# Patient Record
Sex: MALE | Race: WHITE | NOT HISPANIC OR LATINO | Employment: FULL TIME | URBAN - METROPOLITAN AREA
[De-identification: names, ages, dates, MRNs, and addresses within clinical notes are randomized per-mention and may not be internally consistent; named-entity substitution may affect disease eponyms.]

---

## 2021-04-26 ENCOUNTER — OFFICE VISIT (OUTPATIENT)
Dept: PODIATRY | Facility: CLINIC | Age: 36
End: 2021-04-26
Payer: COMMERCIAL

## 2021-04-26 ENCOUNTER — ANCILLARY PROCEDURE (OUTPATIENT)
Dept: GENERAL RADIOLOGY | Facility: CLINIC | Age: 36
End: 2021-04-26
Attending: PODIATRIST
Payer: COMMERCIAL

## 2021-04-26 VITALS
HEIGHT: 73 IN | WEIGHT: 256.5 LBS | DIASTOLIC BLOOD PRESSURE: 100 MMHG | SYSTOLIC BLOOD PRESSURE: 150 MMHG | BODY MASS INDEX: 34 KG/M2

## 2021-04-26 DIAGNOSIS — M79.672 LEFT FOOT PAIN: Primary | ICD-10-CM

## 2021-04-26 DIAGNOSIS — M79.672 LEFT FOOT PAIN: ICD-10-CM

## 2021-04-26 PROCEDURE — 73630 X-RAY EXAM OF FOOT: CPT | Mod: LT | Performed by: RADIOLOGY

## 2021-04-26 PROCEDURE — 99203 OFFICE O/P NEW LOW 30 MIN: CPT | Performed by: PODIATRIST

## 2021-04-26 ASSESSMENT — MIFFLIN-ST. JEOR: SCORE: 2134.42

## 2021-04-26 NOTE — LETTER
4/26/2021         RE: Richard Workman  [address] [place] [address]        Dear Colleague,    Thank you for referring your patient, Richard Workman, to the [facility]. Please see a copy of my visit note below.    PATIENT HISTORY:  Richard Workman is a 36 year old male who presents to clinic for left foot pain.  1 week duration.  Patient states he woke up with swelling and pain over the dorsal lateral left foot.  Denies injury.  Pain is worse after rest and he feels it is better with light activity.  He does have pain with walking occasionally as well.  He is wondering if it is a ganglion cyst.    Review of Systems:  Patient denies fever, chills, rash, wound, stiffness,  numbness, weakness, heart burn, blood in stool, chest pain with activity, calf pain when walking, shortness of breath with activity, chronic cough, easy bleeding/bruising, swelling of ankles, excessive thirst, fatigue, depression, anxiety.  Patient admits to limping.     PAST MEDICAL HISTORY: No pertinent past medical history.     PAST SURGICAL HISTORY: No pertinent past surgical history.     MEDICATIONS: No current outpatient medications on file.     ALLERGIES:  No Known Allergies     SOCIAL HISTORY:   Social History     Socioeconomic History     Marital status:      Spouse name: Not on file     Number of children: Not on file     Years of education: Not on file     Highest education level: Not on file   Occupational History     Not on file   Social Needs     Financial resource strain: Not on file     Food insecurity     Worry: Not on file     Inability: Not on file     Transportation needs     Medical: Not on file     Non-medical: Not on file   Tobacco Use     Smoking status: Not on file   Substance and Sexual Activity     Alcohol use: Not on file     Drug use: Not on file     Sexual activity: Not on file   Lifestyle     Physical activity     Days per week: Not on file     Minutes per session: Not on file      "Stress: Not on file   Relationships     Social connections     Talks on phone: Not on file     Gets together: Not on file     Attends Adventism service: Not on file     Active member of club or organization: Not on file     Attends meetings of clubs or organizations: Not on file     Relationship status: Not on file     Intimate partner violence     Fear of current or ex partner: Not on file     Emotionally abused: Not on file     Physically abused: Not on file     Forced sexual activity: Not on file   Other Topics Concern     Not on file   Social History Narrative     Not on file        FAMILY HISTORY: No pertinent family history.     EXAM:Vitals: BP (!) 150/100   Ht 1.842 m (6' 0.5\")   Wt 116.3 kg (256 lb 8 oz)   BMI 34.31 kg/m    BMI= Body mass index is 34.31 kg/m .    General appearance: Patient is alert and fully cooperative with history & exam.  No sign of distress is noted during the visit.     Psychiatric: Affect is pleasant & appropriate.  Patient appears motivated to improve health.     Respiratory: Breathing is regular & unlabored while sitting.     HEENT: Hearing is intact to spoken word.  Speech is clear.  No gross evidence of visual impairment that would impact ambulation.     Dermatologic: Skin is intact to both feet without significant lesions, rash or abrasion.  No paronychia or evidence of soft tissue infection is noted.     Vascular: DP & PT pulses are intact & regular bilaterally.  No significant edema or varicosities noted.  CFT and skin temperature are normal to both lower extremities.     Neurologic: Lower extremity sensation is intact to light touch.  No evidence of weakness or contracture in the lower extremities.  No evidence of neuropathy.     Musculoskeletal: Left dorsal lateral foot pain with palpation of the extensor digitorum brevis muscle belly.  There is slight edema in this area compared to his other foot.  I do not appreciate a palpable mass.  Minimal pain with palpation of the " left sinus tarsi.  Patient with high arches with standing.  Patient is ambulatory without assistive device or brace.  No gross ankle deformity noted.  No foot or ankle joint effusion is noted.     Left foot x-rays reviewed with patient.  No acute findings.  Pes cavus.    ASSESSMENT: Left foot pain, pes cavus     PLAN:  Reviewed patient's chart in epic.  Discussed condition and treatment options including pros and cons.    Etiology of pain not entirely clear.  Patient with significant pes cavus.  This could be causing lateral column overloading and related pain.  Further differential includes but is not limited to extensor digitorum brevis muscle strain, sinus tarsi syndrome, stress reaction.  I do not appreciate a mass clinically at this time, but advised monitoring.    We discussed treatment options.  I offered MRI for further work-up, patient deferred for now.  I advised RICE.  Advised over-the-counter orthotics such as super feet.  NSAIDs as needed.  Advise follow-up in 2 to 3 weeks if worsening or failing to improve.  Discussed possible need for boot and/or advanced imaging if not improving.    Alfredo Allen DPM, FACFAS    Disclaimer: This note consists of symbols derived from keyboarding, dictation and/or voice recognition software. As a result, there may be errors in the script that have gone undetected. Please consider this when interpreting information found in this chart.    Richard to follow up with Primary Care provider regarding elevated blood pressure.        Again, thank you for allowing me to participate in the care of your patient.        Sincerely,        Alfredo Allen DPM

## 2021-04-26 NOTE — PATIENT INSTRUCTIONS
"    Thank you for choosing [facility]!    Follow up 2-3 weeks as needed    [facility] LOCATIONS     [facility]   [address] [address] [place] [address]   [phone]  [phone]  [phone]   SCHEDULE SURGERY: [phone]  APPOINTMENTS: [phone]  BILLING QUESTIONS: [phone]          PRICE Therapy    Many aches and pains throughout the foot and ankle can be helped with many simple treatments.  This is usually described as PRICE Therapy.      P - Protection - often times, inflammation/pain in the lower extremity is not able to improve simply because the areas involved are never allowed to rest.  Every step we take can bother the problematic area.  Protecting those areas is an important step in the healing process.  This may involve a walking cast boot, a special insert/orthotic device, an ankle brace, or simply avoiding barefoot walking.    R - Rest - in addition to protecting the foot/ankle, resting is an important, but often times difficult, treatment option.  Getting off your feet when they bother you, and specifically avoiding activities that cause pain/discomfort, are very beneficial to prevent, and treat, foot/ankle pain.  \"If there's something that makes it hurt(eg activities, shoe gear), and you keep doing the thing that makes it hurt, it's just going to keep hurting\".      I - Ice - icing regularly can help to decrease inflammation and swelling in the foot, thus decreasing pain.  Using an ice pack or a bag of frozen peas works very well.  Ice for 20 minutes multiple times per day as needed.  Do not place the ice directly on the skin as this can cause tissue damage.    C - Compression - using a compression wrap or an ACE wrap can help to decrease swelling, which can help to decrease pain.  Wearing the wraps is generally not needed at night, but they should be worn on a regular " basis when you are going to be on your feet for prolonged periods as gravity tends to pull fluids down to your feet/ankles.    E - Elevation - elevating your lower extremities multiple times daily for 15-20 minutes can help to decrease swelling, which works well in decreasing pain levels.      NSAID/Tylenol - An anti-inflammatory, like Aleve or ibuprofen, and/or a pain medication, such as Tylenol, can help to improve pain levels and get the issue resolved sooner rather than later.  Anyone with liver issues should be careful with Tylenol, and anyone with high blood pressure or heart, stomach or kidney issues should be careful with anti-inflammatories.  Please ask if you have questions about these medications, including dosage.          OVER THE COUNTER INSERTS    Most of these can be found at your local [employer], [employer], [employer], sporting [employer], or online:    [employer]   Sofsole Fit [employer] (ROOF)   Power Step   Walk-Fit ([employer])  *For heel pain* Arch Cradles  *For heel pain*       ** A good high quality over the counter insert should cost around $40-$50    ** Capsulitis/Metarasalgia - try adding a metatarsal pad on your inserts or find an insert with one built in              Richard to follow up with Primary Care provider regarding elevated blood pressure.

## 2021-04-26 NOTE — PROGRESS NOTES
PATIENT HISTORY:  Richard Workman is a 36 year old male who presents to clinic for left foot pain.  1 week duration.  Patient states he woke up with swelling and pain over the dorsal lateral left foot.  Denies injury.  Pain is worse after rest and he feels it is better with light activity.  He does have pain with walking occasionally as well.  He is wondering if it is a ganglion cyst.    Review of Systems:  Patient denies fever, chills, rash, wound, stiffness,  numbness, weakness, heart burn, blood in stool, chest pain with activity, calf pain when walking, shortness of breath with activity, chronic cough, easy bleeding/bruising, swelling of ankles, excessive thirst, fatigue, depression, anxiety.  Patient admits to limping.     PAST MEDICAL HISTORY: No pertinent past medical history.     PAST SURGICAL HISTORY: No pertinent past surgical history.     MEDICATIONS: No current outpatient medications on file.     ALLERGIES:  No Known Allergies     SOCIAL HISTORY:   Social History     Socioeconomic History     Marital status:      Spouse name: Not on file     Number of children: Not on file     Years of education: Not on file     Highest education level: Not on file   Occupational History     Not on file   Social Needs     Financial resource strain: Not on file     Food insecurity     Worry: Not on file     Inability: Not on file     Transportation needs     Medical: Not on file     Non-medical: Not on file   Tobacco Use     Smoking status: Not on file   Substance and Sexual Activity     Alcohol use: Not on file     Drug use: Not on file     Sexual activity: Not on file   Lifestyle     Physical activity     Days per week: Not on file     Minutes per session: Not on file     Stress: Not on file   Relationships     Social connections     Talks on phone: Not on file     Gets together: Not on file     Attends Latter day service: Not on file     Active member of club or organization: Not on file     Attends meetings of  "clubs or organizations: Not on file     Relationship status: Not on file     Intimate partner violence     Fear of current or ex partner: Not on file     Emotionally abused: Not on file     Physically abused: Not on file     Forced sexual activity: Not on file   Other Topics Concern     Not on file   Social History Narrative     Not on file        FAMILY HISTORY: No pertinent family history.     EXAM:Vitals: BP (!) 150/100   Ht 1.842 m (6' 0.5\")   Wt 116.3 kg (256 lb 8 oz)   BMI 34.31 kg/m    BMI= Body mass index is 34.31 kg/m .    General appearance: Patient is alert and fully cooperative with history & exam.  No sign of distress is noted during the visit.     Psychiatric: Affect is pleasant & appropriate.  Patient appears motivated to improve health.     Respiratory: Breathing is regular & unlabored while sitting.     HEENT: Hearing is intact to spoken word.  Speech is clear.  No gross evidence of visual impairment that would impact ambulation.     Dermatologic: Skin is intact to both feet without significant lesions, rash or abrasion.  No paronychia or evidence of soft tissue infection is noted.     Vascular: DP & PT pulses are intact & regular bilaterally.  No significant edema or varicosities noted.  CFT and skin temperature are normal to both lower extremities.     Neurologic: Lower extremity sensation is intact to light touch.  No evidence of weakness or contracture in the lower extremities.  No evidence of neuropathy.     Musculoskeletal: Left dorsal lateral foot pain with palpation of the extensor digitorum brevis muscle belly.  There is slight edema in this area compared to his other foot.  I do not appreciate a palpable mass.  Minimal pain with palpation of the left sinus tarsi.  Patient with high arches with standing.  Patient is ambulatory without assistive device or brace.  No gross ankle deformity noted.  No foot or ankle joint effusion is noted.  No calf pain, redness, or edema.     Left foot " x-rays reviewed with patient.  No acute findings.  Pes cavus.    ASSESSMENT: Left foot pain, pes cavus     PLAN:  Reviewed patient's chart in epic.  Discussed condition and treatment options including pros and cons.    Etiology of pain not entirely clear.  Patient with significant pes cavus.  This could be causing lateral column overloading and related pain.  Further differential includes but is not limited to extensor digitorum brevis muscle strain, sinus tarsi syndrome, stress reaction.  I do not appreciate a mass clinically at this time, but advised monitoring.    We discussed treatment options.  I offered MRI for further work-up, patient deferred for now.  I advised RICE.  Advised over-the-counter orthotics such as super feet.  NSAIDs as needed.  Advise follow-up in 2 to 3 weeks if worsening or failing to improve.  Discussed possible need for boot and/or advanced imaging if not improving.    Alfredo Allen, AMADEO, FACFAS    Disclaimer: This note consists of symbols derived from keyboarding, dictation and/or voice recognition software. As a result, there may be errors in the script that have gone undetected. Please consider this when interpreting information found in this chart.    Richard to follow up with Primary Care provider regarding elevated blood pressure.

## 2023-02-27 ASSESSMENT — ENCOUNTER SYMPTOMS
NAUSEA: 0
CONSTIPATION: 0
DYSURIA: 0
PARESTHESIAS: 0
ABDOMINAL PAIN: 0
SORE THROAT: 0
JOINT SWELLING: 0
HEMATOCHEZIA: 0
HEARTBURN: 0
DIZZINESS: 0
MYALGIAS: 0
COUGH: 0
HEMATURIA: 0
PALPITATIONS: 0
FEVER: 0
SHORTNESS OF BREATH: 0
DIARRHEA: 0
CHILLS: 0
ARTHRALGIAS: 0
EYE PAIN: 0
WEAKNESS: 0
HEADACHES: 0
NERVOUS/ANXIOUS: 0
FREQUENCY: 0

## 2023-03-06 ENCOUNTER — OFFICE VISIT (OUTPATIENT)
Dept: FAMILY MEDICINE | Facility: OTHER | Age: 38
End: 2023-03-06
Payer: COMMERCIAL

## 2023-03-06 VITALS
TEMPERATURE: 96.9 F | WEIGHT: 250 LBS | HEART RATE: 111 BPM | HEIGHT: 73 IN | DIASTOLIC BLOOD PRESSURE: 92 MMHG | OXYGEN SATURATION: 99 % | BODY MASS INDEX: 33.13 KG/M2 | RESPIRATION RATE: 22 BRPM | SYSTOLIC BLOOD PRESSURE: 140 MMHG

## 2023-03-06 DIAGNOSIS — Z00.00 ROUTINE GENERAL MEDICAL EXAMINATION AT A HEALTH CARE FACILITY: Primary | ICD-10-CM

## 2023-03-06 DIAGNOSIS — I10 PRIMARY HYPERTENSION: ICD-10-CM

## 2023-03-06 PROCEDURE — 99385 PREV VISIT NEW AGE 18-39: CPT | Performed by: PHYSICIAN ASSISTANT

## 2023-03-06 RX ORDER — LISINOPRIL 10 MG/1
10 TABLET ORAL DAILY
Qty: 30 TABLET | Refills: 5 | Status: SHIPPED | OUTPATIENT
Start: 2023-03-06 | End: 2023-08-29

## 2023-03-06 ASSESSMENT — ENCOUNTER SYMPTOMS
SHORTNESS OF BREATH: 0
PARESTHESIAS: 0
PALPITATIONS: 0
SORE THROAT: 0
CHILLS: 0
DYSURIA: 0
ARTHRALGIAS: 0
CONSTIPATION: 0
WEAKNESS: 0
HEADACHES: 0
COUGH: 0
NAUSEA: 0
MYALGIAS: 0
HEARTBURN: 0
DIARRHEA: 0
NERVOUS/ANXIOUS: 0
DIZZINESS: 0
JOINT SWELLING: 0
EYE PAIN: 0
HEMATOCHEZIA: 0
ABDOMINAL PAIN: 0
HEMATURIA: 0
FREQUENCY: 0
FEVER: 0

## 2023-03-06 NOTE — PROGRESS NOTES
SUBJECTIVE:   CC: Richard is an 38 year old who presents for preventative health visit.     Patient has been advised of split billing requirements and indicates understanding: Yes  Healthy Habits:     Getting at least 3 servings of Calcium per day:  Yes    Bi-annual eye exam:  Yes    Dental care twice a year:  Yes    Sleep apnea or symptoms of sleep apnea:  None    Diet:  Regular (no restrictions)    Frequency of exercise:  1 day/week    Duration of exercise:  Less than 15 minutes    Taking medications regularly:  Yes    Medication side effects:  None    PHQ-2 Total Score: 0    Additional concerns today:  Yes    Patient is a 38 year old male who presents today for annual checkup. He informs me that he has not come in to the clinic as his typically will complete a health screening through his employer. Per the patient, these have mostly been normal. However, he says that he was told at his most recent screening that the BP was elevated. He said that they had to check 3x and advised him to follow up with clinic. He was also found to have elevated BP at recent dental visit. He does have a family history of hypertension and hyperlipidemia in mother and RAMÍREZ in father. Wife has told him that he does snore on occasion. Denies daytime sleepiness. BMI 33.31. Some stress as he has 4 children, 14, 12, 8 and 4.    He reports 3 or more cups of coffee/day and regular diet. No caffeine/coffee today. BP is 140/92.    Today's PHQ-2 Score:   PHQ-2 ( 1999 [employer]) 2/27/2023   Q1: Little interest or pleasure in doing things 0   Q2: Feeling down, depressed or hopeless 0   PHQ-2 Score 0   Q1: Little interest or pleasure in doing things Not at all   Q2: Feeling down, depressed or hopeless Not at all   PHQ-2 Score 0       Have you ever done Advance Care Planning? (For example, a Health Directive, POLST, or a discussion with a medical provider or your loved ones about your wishes): No, advance care planning information given to patient to  "review.  Advanced care planning was discussed at today's visit.    Social History     Tobacco Use     Smoking status: Never     Smokeless tobacco: Never   Substance Use Topics     Alcohol use: Not on file         Alcohol Use 2/27/2023   Prescreen: >3 drinks/day or >7 drinks/week? No       Last PSA: No results found for: PSA    Reviewed orders with patient. Reviewed health maintenance and updated orders accordingly - Yes  Patient declined lab work.     Reviewed and updated as needed this visit by clinical staff   Tobacco  Allergies  Meds              Reviewed and updated as needed this visit by Provider      Review of Systems   Constitutional: Negative for chills and fever.   HENT: Negative for congestion, ear pain, hearing loss and sore throat.    Eyes: Negative for pain and visual disturbance.   Respiratory: Negative for cough and shortness of breath.    Cardiovascular: Negative for chest pain, palpitations and peripheral edema.   Gastrointestinal: Negative for abdominal pain, constipation, diarrhea, heartburn, hematochezia and nausea.   Genitourinary: Negative for dysuria, frequency, genital sores, hematuria, impotence, penile discharge and urgency.   Musculoskeletal: Negative for arthralgias, joint swelling and myalgias.   Skin: Negative for rash.   Neurological: Negative for dizziness, weakness, headaches and paresthesias.   Psychiatric/Behavioral: Negative for mood changes. The patient is not nervous/anxious.        OBJECTIVE:   BP (!) 142/98 (BP Location: Left arm, Patient Position: Sitting, Cuff Size: Adult Regular)   Pulse 111   Temp 96.9  F (36.1  C) (Temporal)   Resp 22   Ht 1.845 m (6' 0.64\")   Wt 113.4 kg (250 lb)   SpO2 99%   BMI 33.31 kg/m      Physical Exam  GENERAL: healthy, alert and no distress  EYES: Eyes grossly normal to inspection, PERRL and conjunctivae and sclerae normal  HENT: ear canals and TM's normal, nose and mouth without ulcers or lesions  NECK: no adenopathy, no asymmetry, " "masses, or scars and thyroid normal to palpation  RESP: lungs clear to auscultation - no rales, rhonchi or wheezes  CV: regular rate and rhythm, normal S1 S2, no S3 or S4, no murmur, click or rub, no peripheral edema and peripheral pulses strong  ABDOMEN: soft, nontender, no hepatosplenomegaly, no masses and bowel sounds normal  MS: no gross musculoskeletal defects noted, no edema  NEURO: Normal strength and tone, mentation intact and speech normal  PSYCH: mentation appears normal, affect normal/bright    Diagnostic Test Results:  Patient declined labs. He will drop off copy of recent labs through employer.    ASSESSMENT/PLAN:       ICD-10-CM    1. Routine general medical examination at a health care facility  Z00.00       2. Primary hypertension  I10 lisinopril (ZESTRIL) 10 MG tablet          Patient has been advised of split billing requirements and indicates understanding: Yes      COUNSELING:   Reviewed preventive health counseling, as reflected in patient instructions       Regular exercise       Healthy diet/nutrition       Vision screening       Advance Care Planning      BMI:   Estimated body mass index is 33.31 kg/m  as calculated from the following:    Height as of this encounter: 1.845 m (6' 0.64\").    Weight as of this encounter: 113.4 kg (250 lb).   Weight management plan: Discussed healthy diet and exercise guidelines      He reports that he has never smoked. He has never used smokeless tobacco.    Calvin Arias PA-C  [facility]  "

## 2023-03-07 DIAGNOSIS — I10 PRIMARY HYPERTENSION: Primary | ICD-10-CM

## 2023-08-29 DIAGNOSIS — I10 PRIMARY HYPERTENSION: ICD-10-CM

## 2023-08-29 RX ORDER — LISINOPRIL 10 MG/1
10 TABLET ORAL DAILY
Qty: 90 TABLET | Refills: 1 | Status: SHIPPED | OUTPATIENT
Start: 2023-08-29 | End: 2023-11-28

## 2023-11-28 ENCOUNTER — OFFICE VISIT (OUTPATIENT)
Dept: FAMILY MEDICINE | Facility: OTHER | Age: 38
End: 2023-11-28
Payer: COMMERCIAL

## 2023-11-28 VITALS
HEIGHT: 73 IN | RESPIRATION RATE: 20 BRPM | TEMPERATURE: 97.5 F | OXYGEN SATURATION: 99 % | DIASTOLIC BLOOD PRESSURE: 88 MMHG | WEIGHT: 251 LBS | BODY MASS INDEX: 33.27 KG/M2 | HEART RATE: 108 BPM | SYSTOLIC BLOOD PRESSURE: 136 MMHG

## 2023-11-28 DIAGNOSIS — I10 PRIMARY HYPERTENSION: Primary | ICD-10-CM

## 2023-11-28 DIAGNOSIS — Z13.6 CARDIOVASCULAR SCREENING; LDL GOAL LESS THAN 160: ICD-10-CM

## 2023-11-28 LAB
ANION GAP SERPL CALCULATED.3IONS-SCNC: 8 MMOL/L (ref 7–15)
BUN SERPL-MCNC: 21.1 MG/DL (ref 6–20)
CALCIUM SERPL-MCNC: 10 MG/DL (ref 8.6–10)
CHLORIDE SERPL-SCNC: 101 MMOL/L (ref 98–107)
CHOLEST SERPL-MCNC: 259 MG/DL
CREAT SERPL-MCNC: 1.06 MG/DL (ref 0.67–1.17)
DEPRECATED HCO3 PLAS-SCNC: 30 MMOL/L (ref 22–29)
EGFRCR SERPLBLD CKD-EPI 2021: >90 ML/MIN/1.73M2
GLUCOSE SERPL-MCNC: 123 MG/DL (ref 70–99)
HDLC SERPL-MCNC: 80 MG/DL
LDLC SERPL CALC-MCNC: 154 MG/DL
NONHDLC SERPL-MCNC: 179 MG/DL
POTASSIUM SERPL-SCNC: 4.8 MMOL/L (ref 3.4–5.3)
SODIUM SERPL-SCNC: 139 MMOL/L (ref 135–145)
TRIGL SERPL-MCNC: 126 MG/DL

## 2023-11-28 PROCEDURE — 90471 IMMUNIZATION ADMIN: CPT | Performed by: PHYSICIAN ASSISTANT

## 2023-11-28 PROCEDURE — 90472 IMMUNIZATION ADMIN EACH ADD: CPT | Performed by: PHYSICIAN ASSISTANT

## 2023-11-28 PROCEDURE — 80048 BASIC METABOLIC PNL TOTAL CA: CPT | Performed by: PHYSICIAN ASSISTANT

## 2023-11-28 PROCEDURE — 99214 OFFICE O/P EST MOD 30 MIN: CPT | Mod: 25 | Performed by: PHYSICIAN ASSISTANT

## 2023-11-28 PROCEDURE — 36415 COLL VENOUS BLD VENIPUNCTURE: CPT | Performed by: PHYSICIAN ASSISTANT

## 2023-11-28 PROCEDURE — 80061 LIPID PANEL: CPT | Performed by: PHYSICIAN ASSISTANT

## 2023-11-28 PROCEDURE — 90686 IIV4 VACC NO PRSV 0.5 ML IM: CPT | Performed by: PHYSICIAN ASSISTANT

## 2023-11-28 PROCEDURE — 90715 TDAP VACCINE 7 YRS/> IM: CPT | Performed by: PHYSICIAN ASSISTANT

## 2023-11-28 RX ORDER — LISINOPRIL 20 MG/1
20 TABLET ORAL DAILY
Qty: 90 TABLET | Refills: 1 | Status: SHIPPED | OUTPATIENT
Start: 2023-11-28 | End: 2024-05-20

## 2023-11-28 NOTE — PROGRESS NOTES
"  Assessment & Plan     Primary hypertension  Patient was seen in March 2023 at which time he was started on lisinopril. He missed the [facility] message requesting follow up BMP, but says that he has been taking the medication as directed without adverse effect. BP is improved today at 136/88. Reviewed the stages of hypertension with the patient and recommended slight increase in dose to 20mg daily. I will also have the patient work on improving daily lifestyle habits including low salt, regular aerobic exercise, structured sleep and low stress. Follow up in 6 months for next annual checkup.   - Lipid panel reflex to direct LDL Non-fasting; Future  - lisinopril (ZESTRIL) 20 MG tablet; Take 1 tablet (20 mg) by mouth daily  - Basic metabolic panel  (Ca, Cl, CO2, Creat, Gluc, K, Na, BUN); Future  - Lipid panel reflex to direct LDL Non-fasting  - Basic metabolic panel  (Ca, Cl, CO2, Creat, Gluc, K, Na, BUN)    CARDIOVASCULAR SCREENING; LDL GOAL LESS THAN 160  Patient is due for lipids today. I will update this today and notify the patient of the results.   - Lipid panel reflex to direct LDL Non-fasting; Future  - Lipid panel reflex to direct LDL Non-fasting      BMI:   Estimated body mass index is 33.57 kg/m  as calculated from the following:    Height as of this encounter: 1.842 m (6' 0.5\").    Weight as of this encounter: 113.9 kg (251 lb).   Weight management plan: Discussed healthy diet and exercise guidelines    Reviewed health maintenance and updated per the patient's preferences.       ABBY [facility] MELO Ramos is a 38 year old, presenting for the following health issues:  Recheck Medication      11/28/2023     7:19 AM   Additional Questions   Roomed by Valarie ROACH   Accompanied by self       History of Present Illness       Hypertension: He presents for follow up of hypertension.  He does check blood pressure  regularly outside of the clinic. Outpatient blood " "pressures have not been over 140/90. He does not follow a low salt diet.     He eats 2-3 servings of fruits and vegetables daily.He consumes 1 sweetened beverage(s) daily.He exercises with enough effort to increase his heart rate 10 to 19 minutes per day.  He exercises with enough effort to increase his heart rate 3 or less days per week.   He is taking medications regularly.     Patient has been in good general health since previous visit this past spring 2023. He took his family to [place] to go through the bulletn. this past summer and plans to return this Feb for his 39th birthday.     Review of Systems   Constitutional, HEENT, cardiovascular, pulmonary, gi and gu systems are negative, except as otherwise noted.      Objective    /88   Pulse 108   Temp 97.5  F (36.4  C) (Temporal)   Resp 20   Ht 1.842 m (6' 0.5\")   Wt 113.9 kg (251 lb)   SpO2 99%   BMI 33.57 kg/m    Body mass index is 33.57 kg/m .  Physical Exam   GENERAL: healthy, alert and no distress  EYES: Eyes grossly normal to inspection, PERRL and conjunctivae and sclerae normal  HENT: ear canals and TM's normal, nose and mouth without ulcers or lesions  NECK: no adenopathy, no asymmetry, masses, or scars and thyroid normal to palpation  RESP: lungs clear to auscultation - no rales, rhonchi or wheezes  CV: regular rate and rhythm, normal S1 S2, no S3 or S4, no murmur, click or rub, no peripheral edema and peripheral pulses strong  MS: no gross musculoskeletal defects noted, no edema  PSYCH: mentation appears normal, affect normal/bright    No results found for any visits on 11/28/23.        Prior to immunization administration, verified patients identity using patient s name and date of birth. Please see Immunization Activity for additional information.     Screening Questionnaire for Adult Immunization    Are you sick today?   No   Do you have allergies to medications, food, a vaccine component or latex?   No   Have you ever had a serious " reaction after receiving a vaccination?   No   Do you have a long-term health problem with heart, lung, kidney, or metabolic disease (e.g., diabetes), asthma, a blood disorder, no spleen, complement component deficiency, a cochlear implant, or a spinal fluid leak?  Are you on long-term aspirin therapy?   No   Do you have cancer, leukemia, HIV/AIDS, or any other immune system problem?   No   Do you have a parent, brother, or sister with an immune system problem?   No   In the past 3 months, have you taken medications that affect  your immune system, such as prednisone, other steroids, or anticancer drugs; drugs for the treatment of rheumatoid arthritis, Crohn s disease, or psoriasis; or have you had radiation treatments?   No   Have you had a seizure, or a brain or other nervous system problem?   No   During the past year, have you received a transfusion of blood or blood    products, or been given immune (gamma) globulin or antiviral drug?   No   For women: Are you pregnant or is there a chance you could become       pregnant during the next month?   No   Have you received any vaccinations in the past 4 weeks?   No     Immunization questionnaire answers were all negative.      Patient instructed to remain in clinic for 15 minutes afterwards, and to report any adverse reactions.     Screening performed by Valarie Pink CMA on 11/28/2023 at 7:23 AM.

## 2024-05-18 ENCOUNTER — HEALTH MAINTENANCE LETTER (OUTPATIENT)
Age: 39
End: 2024-05-18

## 2024-05-19 DIAGNOSIS — I10 PRIMARY HYPERTENSION: ICD-10-CM

## 2024-05-20 RX ORDER — LISINOPRIL 20 MG/1
20 TABLET ORAL DAILY
Qty: 90 TABLET | Refills: 1 | Status: SHIPPED | OUTPATIENT
Start: 2024-05-20

## 2024-11-20 DIAGNOSIS — I10 PRIMARY HYPERTENSION: ICD-10-CM

## 2024-11-20 RX ORDER — LISINOPRIL 20 MG/1
20 TABLET ORAL DAILY
Qty: 90 TABLET | Refills: 0 | Status: SHIPPED | OUTPATIENT
Start: 2024-11-20

## 2025-02-16 DIAGNOSIS — I10 PRIMARY HYPERTENSION: ICD-10-CM

## 2025-02-17 RX ORDER — LISINOPRIL 20 MG/1
20 TABLET ORAL DAILY
Qty: 90 TABLET | Refills: 0 | OUTPATIENT
Start: 2025-02-17

## 2025-02-17 NOTE — TELEPHONE ENCOUNTER
Patient is overdue for office visit. Cherelle period has been provided and patient did not schedule. Please help patient schedule appointment and I can fill medication to get them to that date.     Thanks,  Calvin Arias PA-C on 2/17/2025 at 4:55 PM

## 2025-02-18 ENCOUNTER — MYC MEDICAL ADVICE (OUTPATIENT)
Dept: FAMILY MEDICINE | Facility: OTHER | Age: 40
End: 2025-02-18
Payer: COMMERCIAL

## 2025-02-18 NOTE — TELEPHONE ENCOUNTER
[facility] msg sent to pt letting her know. Post poning to make sure pt views msg.    Valarie Pink, CMA

## 2025-02-20 DIAGNOSIS — I10 PRIMARY HYPERTENSION: ICD-10-CM

## 2025-02-20 RX ORDER — LISINOPRIL 20 MG/1
20 TABLET ORAL DAILY
Qty: 30 TABLET | Refills: 0 | Status: SHIPPED | OUTPATIENT
Start: 2025-02-20

## 2025-02-20 NOTE — TELEPHONE ENCOUNTER
Patient Contact    Attempt # 1    Was call answered?  No.  Left message on voicemail with information to call me back.    Roxy RICHN, RN

## 2025-02-20 NOTE — TELEPHONE ENCOUNTER
Patient returned call. We cancelled virtual appointment and scheduled for office visit for annual.     Appointments in Next Year      Mar 20, 2025 8:00 AM  (Arrive by 7:40 AM)  Adult Preventative Visit with Calvin Arias PA-C  [facility] [phone]          He will need refill of medication to get him to appointment date, pended below.     Roxy RICHN, RN

## 2025-03-19 SDOH — HEALTH STABILITY: PHYSICAL HEALTH: ON AVERAGE, HOW MANY DAYS PER WEEK DO YOU ENGAGE IN MODERATE TO STRENUOUS EXERCISE (LIKE A BRISK WALK)?: 2 DAYS

## 2025-03-19 SDOH — HEALTH STABILITY: PHYSICAL HEALTH: ON AVERAGE, HOW MANY MINUTES DO YOU ENGAGE IN EXERCISE AT THIS LEVEL?: 30 MIN

## 2025-03-19 ASSESSMENT — SOCIAL DETERMINANTS OF HEALTH (SDOH): HOW OFTEN DO YOU GET TOGETHER WITH FRIENDS OR RELATIVES?: ONCE A WEEK

## 2025-03-20 ENCOUNTER — OFFICE VISIT (OUTPATIENT)
Dept: FAMILY MEDICINE | Facility: OTHER | Age: 40
End: 2025-03-20
Payer: COMMERCIAL

## 2025-03-20 VITALS
HEIGHT: 72 IN | DIASTOLIC BLOOD PRESSURE: 90 MMHG | BODY MASS INDEX: 34.27 KG/M2 | SYSTOLIC BLOOD PRESSURE: 150 MMHG | RESPIRATION RATE: 18 BRPM | WEIGHT: 253 LBS | TEMPERATURE: 96.6 F | HEART RATE: 96 BPM | OXYGEN SATURATION: 99 %

## 2025-03-20 DIAGNOSIS — Z00.00 ROUTINE GENERAL MEDICAL EXAMINATION AT A HEALTH CARE FACILITY: Primary | ICD-10-CM

## 2025-03-20 DIAGNOSIS — I10 PRIMARY HYPERTENSION: ICD-10-CM

## 2025-03-20 RX ORDER — LISINOPRIL 40 MG/1
40 TABLET ORAL DAILY
Qty: 90 TABLET | Refills: 0 | Status: SHIPPED | OUTPATIENT
Start: 2025-03-20

## 2025-03-20 NOTE — PROGRESS NOTES
"Preventive Care Visit  [facility]  Calvin Arias PA-C, Family Medicine  Mar 20, 2025      Assessment & Plan     Routine general medical examination at a health care facility  Patient is a 40 year old male who presents today for annual checkup. He was last seen by me in 2023. He reports good overall health since that visit. He tries to stay active with his sons playing catch in the yard, walking outdoors, etc. His son is in hockey, so the patient is also doing free skating with him in the winter months. Reviewed healthy lifestyle recommendations with the patient. Reviewed health maintenance and updated per the patient's preferences.  - Lipid panel reflex to direct LDL Non-fasting; Future    Primary hypertension  BP is elevated at 150/90 today. I will have the patient increase his lisinopril to 40mg daily and return for FLOAT BP check in 2-4 weeks. He would like to wait to complete labs until that time. I am comfortable with this. Reminder set. Reviewed tips to lower BP such as limiting salt and caffeine, good sleep and low stress.   - BASIC METABOLIC PANEL; Future  - lisinopril (ZESTRIL) 40 MG tablet; Take 1 tablet (40 mg) by mouth daily.    Patient has been advised of split billing requirements and indicates understanding: Yes        BMI  Estimated body mass index is 33.9 kg/m  as calculated from the following:    Height as of this encounter: 1.84 m (6' 0.44\").    Weight as of this encounter: 114.8 kg (253 lb).   Weight management plan: Discussed healthy diet and exercise guidelines    Counseling  Appropriate preventive services were addressed with this patient via screening, questionnaire, or discussion as appropriate for fall prevention, nutrition, physical activity, Tobacco-use cessation, social engagement, weight loss and cognition.  Checklist reviewing preventive services available has been given to the patient.  Reviewed patient's diet, addressing concerns and/or questions.   He is " at risk for lack of exercise and has been provided with information to increase physical activity for the benefit of his well-being.   The patient reports drinking more than 3 alcoholic drinks per day and/or more than 7 drhnks per week. The patient was counseled and given information about possible harmful effects of excessive alcohol intake.      Haily Ramos is a 40 year old, presenting for the following:  Physical and Recheck Medication        3/20/2025     7:34 AM   Additional Questions   Roomed by Valarie ROACH   Accompanied by self      HPI     Advance Care Planning  Patient does not have a Health Care Directive: Discussed advance care planning with patient; however, patient declined at this time.      3/19/2025   General Health   How would you rate your overall physical health? Good   Feel stress (tense, anxious, or unable to sleep) Not at all         3/19/2025   Nutrition   Three or more servings of calcium each day? Yes   Diet: Regular (no restrictions)   How many servings of fruit and vegetables per day? (!) 2-3   How many sweetened beverages each day? 0-1         3/19/2025   Exercise   Days per week of moderate/strenous exercise 2 days   Average minutes spent exercising at this level 30 min   (!) EXERCISE CONCERN      3/19/2025   Social Factors   Frequency of gathering with friends or relatives Once a week   Worry food won't last until get money to buy more No   Food not last or not have enough money for food? No   Do you have housing? (Housing is defined as stable permanent housing and does not include staying ouside in a car, in a tent, in an abandoned building, in an overnight shelter, or couch-surfing.) Yes   Are you worried about losing your housing? No   Lack of transportation? No   Unable to get utilities (heat,electricity)? No         3/19/2025   Dental   Dentist two times every year? Yes       Today's PHQ-2 Score:       3/20/2025     7:36 AM   PHQ-2 ( 1999 [employer])   Q1: Little interest or pleasure  in doing things 0   Q2: Feeling down, depressed or hopeless 0   PHQ-2 Score 0         3/19/2025   Substance Use   Alcohol more than 3/day or more than 7/wk Yes   How often do you have a drink containing alcohol 2 to 3 times a week   How many alcohol drinks on typical day 1 or 2   How often do you have 5+ drinks at one occasion Never   Audit 2/3 Score 0   How often not able to stop drinking once started Never   How often failed to do what normally expected Never   How often needed first drink in am after a heavy drinking session Never   How often feeling of guilt or remorse after drinking Never   How often unable to remember what happened the night before Never   Have you or someone else been injured because of your drinking No   Has anyone been concerned or suggested you cut down on drinking No   TOTAL SCORE - AUDIT 3   Do you use any other substances recreationally? No     Social History     Tobacco Use    Smoking status: Never    Smokeless tobacco: Never   Vaping Use    Vaping status: Never Used   Substance Use Topics    Alcohol use: Yes    Drug use: Never           3/19/2025   STI Screening   New sexual partner(s) since last STI/HIV test? No   ASCVD Risk   The 10-year ASCVD risk score (Lin COOPER, et al., 2019) is: 1.7%    Values used to calculate the score:      Age: 40 years      Sex: Male      Is Non- : No      Diabetic: No      Tobacco smoker: No      Systolic Blood Pressure: 150 mmHg      Is BP treated: Yes      HDL Cholesterol: 80 mg/dL      Total Cholesterol: 259 mg/dL        3/19/2025   Contraception/Family Planning   Questions about contraception or family planning No        Reviewed and updated as needed this visit by Provider                    Past Medical History:   Diagnosis Date    Hypertension          Review of Systems  Constitutional, HEENT, cardiovascular, pulmonary, gi and gu systems are negative, except as otherwise noted.     Objective    Exam  BP (!) 150/90   " Pulse 96   Temp (!) 96.6  F (35.9  C) (Temporal)   Resp 18   Ht 1.84 m (6' 0.44\")   Wt 114.8 kg (253 lb)   SpO2 99%   BMI 33.90 kg/m     Estimated body mass index is 33.9 kg/m  as calculated from the following:    Height as of this encounter: 1.84 m (6' 0.44\").    Weight as of this encounter: 114.8 kg (253 lb).    Physical Exam  GENERAL: alert and no distress  EYES: Eyes grossly normal to inspection, PERRL and conjunctivae and sclerae normal  HENT: ear canals and TM's normal, nose and mouth without ulcers or lesions  NECK: no adenopathy, no asymmetry, masses, or scars  RESP: lungs clear to auscultation - no rales, rhonchi or wheezes  CV: regular rate and rhythm, normal S1 S2, no S3 or S4, no murmur, click or rub, no peripheral edema  ABDOMEN: soft, nontender, no hepatosplenomegaly, no masses and bowel sounds normal  MS: no gross musculoskeletal defects noted, no edema  NEURO: Normal strength and tone, mentation intact and speech normal  PSYCH: mentation appears normal, affect normal/bright        Signed Electronically by: Calvin Arias PA-C    "

## 2025-03-20 NOTE — PATIENT INSTRUCTIONS
Patient Education   Preventive Care Advice   This is general advice given by our system to help you stay healthy. However, your care team may have specific advice just for you. Please talk to your care team about your preventive care needs.  Nutrition  Eat 5 or more servings of fruits and vegetables each day.  Try wheat bread, brown rice and whole grain pasta (instead of white bread, rice, and pasta).  Get enough calcium and vitamin D. Check the label on foods and aim for 100% of the RDA (recommended daily allowance).  Lifestyle  Exercise at least 150 minutes each week  (30 minutes a day, 5 days a week).  Do muscle strengthening activities 2 days a week. These help control your weight and prevent disease.  No smoking.  Wear sunscreen to prevent skin cancer.  Have a dental exam and cleaning every 6 months.  Yearly exams  See your health care team every year to talk about:  Any changes in your health.  Any medicines your care team has prescribed.  Preventive care, family planning, and ways to prevent chronic diseases.  Shots (vaccines)   HPV shots (up to age 26), if you've never had them before.  Hepatitis B shots (up to age 59), if you've never had them before.  COVID-19 shot: Get this shot when it's due.  Flu shot: Get a flu shot every year.  Tetanus shot: Get a tetanus shot every 10 years.  Pneumococcal, hepatitis A, and RSV shots: Ask your care team if you need these based on your risk.  Shingles shot (for age 50 and up)  General health tests  Diabetes screening:  Starting at age 35, Get screened for diabetes at least every 3 years.  If you are younger than age 35, ask your care team if you should be screened for diabetes.  Cholesterol test: At age 39, start having a cholesterol test every 5 years, or more often if advised.  Bone density scan (DEXA): At age 50, ask your care team if you should have this scan for osteoporosis (brittle bones).  Hepatitis C: Get tested at least once in your life.  STIs (sexually  transmitted infections)  Before age 24: Ask your care team if you should be screened for STIs.  After age 24: Get screened for STIs if you're at risk. You are at risk for STIs (including HIV) if:  You are sexually active with more than one person.  You don't use condoms every time.  You or a partner was diagnosed with a sexually transmitted infection.  If you are at risk for HIV, ask about PrEP medicine to prevent HIV.  Get tested for HIV at least once in your life, whether you are at risk for HIV or not.  Cancer screening tests  Cervical cancer screening: If you have a cervix, begin getting regular cervical cancer screening tests starting at age 21.  Breast cancer scan (mammogram): If you've ever had breasts, begin having regular mammograms starting at age 40. This is a scan to check for breast cancer.  Colon cancer screening: It is important to start screening for colon cancer at age 45.  Have a colonoscopy test every 10 years (or more often if you're at risk) Or, ask your provider about stool tests like a FIT test every year or Cologuard test every 3 years.  To learn more about your testing options, visit:   .  For help making a decision, visit:   https://bit.ly/ch28452.  Prostate cancer screening test: If you have a prostate, ask your care team if a prostate cancer screening test (PSA) at age 55 is right for you.  Lung cancer screening: If you are a current or former smoker ages 50 to 80, ask your care team if ongoing lung cancer screenings are right for you.  For informational purposes only. Not to replace the advice of your health care provider. Copyright   2023 [facility]. All rights reserved. Clinically reviewed by the [facility] Transitions Program. [employer] 765504 - REV 01/24.  9 Ways to Cut Back on Drinking  Maybe you've found yourself drinking more alcohol than you'd prefer. If you want to cut back, here are some ideas to try.    Think before you drink.  Do you really want a drink,  "or is it just a habit? If you're used to having a drink at a certain time, try doing something else then.     Look for substitutes.  Find some no-alcohol drinks that you enjoy, like flavored seltzer water, tea with honey, or tonic with a slice of lime. Or try alcohol-free beer or \"virgin\" cocktails (without the alcohol).     Drink more water.  Use water to quench your thirst. Drink a glass of water before you have any alcohol. Have another glass along with every drink or between drinks.     Shrink your drink.  For example, have a bottle of beer instead of a pint. Use a smaller glass for wine. Choose drinks with lower alcohol content (ABV%). Or use less liquor and more mixer in cocktails.     Slow down.  It's easy to drink quickly and without thinking about it. Pay attention, and make each drink last longer.     Do the math.  Total up how much you spend on alcohol each month. How much is that a year? If you cut back, what could you do with the money you save?     Take a break.  Choose a day or two each week when you won't drink at all. Notice how you feel on those days, physically and emotionally. How did you sleep? Do you feel better? Over time, add more break days.     Count calories.  Would you like to lose some weight? For some people that's a good motivator for cutting back. Figure out how many calories are in each drink. How many does that add up to in a day? In a week? In a month?     Practice saying no.  Be ready when someone offers you a drink. Try: \"Thanks, I've had enough.\" Or \"Thanks, but I'm cutting back.\" Or \"No, thanks. I feel better when I drink less.\"   Current as of: August 20, 2024  Content Version: 14.4 2024-2025 [employer].   Care instructions adapted under license by your healthcare professional. If you have questions about a medical condition or this instruction, always ask your healthcare professional. [employer] disclaims any warranty or liability for your use of " this information.

## 2025-03-24 ENCOUNTER — PATIENT OUTREACH (OUTPATIENT)
Dept: CARE COORDINATION | Facility: CLINIC | Age: 40
End: 2025-03-24
Payer: COMMERCIAL

## 2025-04-17 ENCOUNTER — TELEPHONE (OUTPATIENT)
Dept: FAMILY MEDICINE | Facility: OTHER | Age: 40
End: 2025-04-17

## 2025-04-17 ENCOUNTER — LAB (OUTPATIENT)
Dept: LAB | Facility: OTHER | Age: 40
End: 2025-04-17
Payer: COMMERCIAL

## 2025-04-17 ENCOUNTER — ALLIED HEALTH/NURSE VISIT (OUTPATIENT)
Dept: FAMILY MEDICINE | Facility: OTHER | Age: 40
End: 2025-04-17
Payer: COMMERCIAL

## 2025-04-17 VITALS — SYSTOLIC BLOOD PRESSURE: 122 MMHG | DIASTOLIC BLOOD PRESSURE: 98 MMHG | OXYGEN SATURATION: 99 % | HEART RATE: 84 BPM

## 2025-04-17 DIAGNOSIS — I10 PRIMARY HYPERTENSION: Primary | ICD-10-CM

## 2025-04-17 DIAGNOSIS — I10 PRIMARY HYPERTENSION: ICD-10-CM

## 2025-04-17 DIAGNOSIS — Z01.30 BP CHECK: Primary | ICD-10-CM

## 2025-04-17 DIAGNOSIS — Z00.00 ROUTINE GENERAL MEDICAL EXAMINATION AT A HEALTH CARE FACILITY: ICD-10-CM

## 2025-04-17 LAB
ANION GAP SERPL CALCULATED.3IONS-SCNC: 14 MMOL/L (ref 7–15)
BUN SERPL-MCNC: 21.3 MG/DL (ref 6–20)
CALCIUM SERPL-MCNC: 9.8 MG/DL (ref 8.8–10.4)
CHLORIDE SERPL-SCNC: 103 MMOL/L (ref 98–107)
CHOLEST SERPL-MCNC: 253 MG/DL
CREAT SERPL-MCNC: 1.07 MG/DL (ref 0.67–1.17)
EGFRCR SERPLBLD CKD-EPI 2021: 89 ML/MIN/1.73M2
FASTING STATUS PATIENT QL REPORTED: NO
FASTING STATUS PATIENT QL REPORTED: NO
GLUCOSE SERPL-MCNC: 139 MG/DL (ref 70–99)
HCO3 SERPL-SCNC: 23 MMOL/L (ref 22–29)
HDLC SERPL-MCNC: 85 MG/DL
LDLC SERPL CALC-MCNC: 141 MG/DL
NONHDLC SERPL-MCNC: 168 MG/DL
POTASSIUM SERPL-SCNC: 4.7 MMOL/L (ref 3.4–5.3)
SODIUM SERPL-SCNC: 140 MMOL/L (ref 135–145)
TRIGL SERPL-MCNC: 137 MG/DL

## 2025-04-17 RX ORDER — HYDROCHLOROTHIAZIDE 25 MG/1
25 TABLET ORAL DAILY
Qty: 60 TABLET | Refills: 0 | Status: SHIPPED | OUTPATIENT
Start: 2025-04-17

## 2025-04-17 RX ORDER — LISINOPRIL AND HYDROCHLOROTHIAZIDE 20; 25 MG/1; MG/1
2 TABLET ORAL DAILY
Qty: 60 TABLET | Refills: 1 | Status: SHIPPED | OUTPATIENT
Start: 2025-04-17 | End: 2025-04-17

## 2025-04-17 NOTE — PROGRESS NOTES
I met with Richard Workman at the request of Calvin Burnett  to recheck his blood pressure.  Blood pressure medications on the med list were reviewed with patient.    Patient has taken all medications as per usual regimen: Yes  Patient reports tolerating them without any issues or concerns: Yes    Vitals:    04/17/25 0851   BP: (!) 140/100   Pulse: 84   SpO2: 99%       After 5 minutes, the patient's blood pressure remained greater than or equal to 140/90.    Is the patient currently having any chest pain? No  Does the patient currently have a headache? No  Does the patient currently have any vision changes? No  Does the patient currently have any nausea? No  Does the patient currently have any abdominal pain? No    2nd BP is 122/98       lisinopril (ZESTRIL) 40 MG tablet       The previous encounter was reviewed.  The patient was discharged and the note will be sent to the provider for final review.

## 2025-04-21 ENCOUNTER — PATIENT OUTREACH (OUTPATIENT)
Dept: CARE COORDINATION | Facility: CLINIC | Age: 40
End: 2025-04-21
Payer: COMMERCIAL

## 2025-05-05 ENCOUNTER — MYC MEDICAL ADVICE (OUTPATIENT)
Dept: FAMILY MEDICINE | Facility: OTHER | Age: 40
End: 2025-05-05
Payer: COMMERCIAL

## 2025-05-05 NOTE — TELEPHONE ENCOUNTER
Placed in MA task box. Patient's signature still required for form. Please reach out to coordinate whether they want this mailed back or would prefer to come in to sign and have us fax. Thanks.    Thanks,  Calvin Arias PA-C on 5/5/2025 at 11:52 AM

## 2025-06-05 ENCOUNTER — TELEPHONE (OUTPATIENT)
Dept: FAMILY MEDICINE | Facility: OTHER | Age: 40
End: 2025-06-05
Payer: COMMERCIAL

## 2025-06-05 ENCOUNTER — MYC MEDICAL ADVICE (OUTPATIENT)
Dept: FAMILY MEDICINE | Facility: OTHER | Age: 40
End: 2025-06-05
Payer: COMMERCIAL

## 2025-06-05 NOTE — TELEPHONE ENCOUNTER
Patient Quality Outreach    Patient is due for the following:   Hypertension -  BP check    Action(s) Taken:   Schedule a nurse only visit for BP check    Type of outreach:    Sent Access Closure message.    Questions for provider review:    None         Valarie Pink CMA  Chart routed to None.

## 2025-06-12 ENCOUNTER — ALLIED HEALTH/NURSE VISIT (OUTPATIENT)
Dept: FAMILY MEDICINE | Facility: OTHER | Age: 40
End: 2025-06-12
Payer: COMMERCIAL

## 2025-06-12 VITALS — DIASTOLIC BLOOD PRESSURE: 74 MMHG | SYSTOLIC BLOOD PRESSURE: 128 MMHG

## 2025-06-12 DIAGNOSIS — I10 PRIMARY HYPERTENSION: Primary | ICD-10-CM

## 2025-06-12 NOTE — PROGRESS NOTES
I met with Richard Workman at the request of Ephraim Arias to recheck his blood pressure.  Blood pressure medications on the med list were reviewed with patient.    Patient has taken all medications as per usual regimen: Yes  Patient reports tolerating them without any issues or concerns: No    Vitals:    06/12/25 0953   BP: 134/74       Blood pressure was taken, previous encounter was reviewed, recorded blood pressure below 140/90.  Patient was discharged and the note will be sent to the provider for final review.